# Patient Record
Sex: FEMALE | Race: WHITE | NOT HISPANIC OR LATINO | Employment: UNEMPLOYED | URBAN - NONMETROPOLITAN AREA
[De-identification: names, ages, dates, MRNs, and addresses within clinical notes are randomized per-mention and may not be internally consistent; named-entity substitution may affect disease eponyms.]

---

## 2017-05-01 ENCOUNTER — OFFICE VISIT (OUTPATIENT)
Dept: CARDIOLOGY | Facility: CLINIC | Age: 52
End: 2017-05-01

## 2017-05-01 VITALS
SYSTOLIC BLOOD PRESSURE: 164 MMHG | HEART RATE: 80 BPM | WEIGHT: 172 LBS | OXYGEN SATURATION: 95 % | HEIGHT: 65 IN | DIASTOLIC BLOOD PRESSURE: 101 MMHG | BODY MASS INDEX: 28.66 KG/M2

## 2017-05-01 DIAGNOSIS — R06.02 SOB (SHORTNESS OF BREATH): ICD-10-CM

## 2017-05-01 DIAGNOSIS — I10 ESSENTIAL HYPERTENSION: Primary | ICD-10-CM

## 2017-05-01 DIAGNOSIS — R00.2 PALPITATIONS: ICD-10-CM

## 2017-05-01 PROCEDURE — 93000 ELECTROCARDIOGRAM COMPLETE: CPT | Performed by: PHYSICIAN ASSISTANT

## 2017-05-01 PROCEDURE — 99213 OFFICE O/P EST LOW 20 MIN: CPT | Performed by: PHYSICIAN ASSISTANT

## 2017-05-01 RX ORDER — METOPROLOL SUCCINATE 25 MG/1
25 TABLET, EXTENDED RELEASE ORAL DAILY
Qty: 30 TABLET | Refills: 11 | Status: SHIPPED | OUTPATIENT
Start: 2017-05-01 | End: 2018-02-14

## 2018-02-13 ENCOUNTER — OFFICE VISIT (OUTPATIENT)
Dept: CARDIOLOGY | Facility: CLINIC | Age: 53
End: 2018-02-13

## 2018-02-13 VITALS
OXYGEN SATURATION: 97 % | SYSTOLIC BLOOD PRESSURE: 139 MMHG | BODY MASS INDEX: 26.73 KG/M2 | HEART RATE: 102 BPM | WEIGHT: 160.4 LBS | DIASTOLIC BLOOD PRESSURE: 88 MMHG | HEIGHT: 65 IN

## 2018-02-13 DIAGNOSIS — I44.7 LEFT BUNDLE BRANCH BLOCK: ICD-10-CM

## 2018-02-13 DIAGNOSIS — R06.02 SHORTNESS OF BREATH: ICD-10-CM

## 2018-02-13 DIAGNOSIS — I10 ESSENTIAL HYPERTENSION: Primary | ICD-10-CM

## 2018-02-13 DIAGNOSIS — R00.2 PALPITATION: ICD-10-CM

## 2018-02-13 PROCEDURE — 93000 ELECTROCARDIOGRAM COMPLETE: CPT | Performed by: PHYSICIAN ASSISTANT

## 2018-02-13 PROCEDURE — 99214 OFFICE O/P EST MOD 30 MIN: CPT | Performed by: PHYSICIAN ASSISTANT

## 2018-02-13 NOTE — PROGRESS NOTES
Problem list     Subjective   Debbie Lee is a 53 y.o. female     Chief Complaint   Patient presents with   • Hypertension     Here for routine f/u   • Hyperlipidemia   • Heart Murmur   • Palpitations       HPI       Problem list  1. History of chest pain  1.1 patient underwent treadmill stress testing which she had class II angina symptoms on a Ke protocol which test was aborted. She subsequently underwent catheterization.  1.2 cardiac catheterization in November 2015 which demonstrated normal coronaries and preserved LV function  2. Hypertension  3. Chronic pain  4. Chronic tobacco use    Patient is a 53-year-old female that presents back for follow-up.  She has been doing well.  She has no chest pain or pressure.  She will continue to have very mild dyspnea at baseline.  This is chronic with her history of chronic tobacco use.  No PND orthopnea.  She still will experience palpitations.  She notices an irregular heartbeat.  Metoprolol has helped.  No dizziness presyncope or syncope.  Otherwise is doing well      Outpatient Encounter Prescriptions as of 2/13/2018   Medication Sig Dispense Refill   • atorvastatin (LIPITOR) 40 MG tablet Take 1 tablet by mouth daily.     • benazepril (LOTENSIN) 40 MG tablet Take 1 tablet by mouth every 12 (twelve) hours.     • clopidogrel (PLAVIX) 75 MG tablet Take 1 tablet by mouth daily.     • pantoprazole (PROTONIX) 40 MG EC tablet Take 1 tablet by mouth daily.     • [DISCONTINUED] metoprolol succinate XL (TOPROL-XL) 25 MG 24 hr tablet Take 1 tablet by mouth Daily. 30 tablet 11   • metoprolol succinate XL (TOPROL-XL) 50 MG 24 hr tablet Take 1 tablet by mouth Daily. 30 tablet 6   • [DISCONTINUED] aspirin 81 MG tablet Take 1 tablet by mouth 2 (two) times a day.     • [DISCONTINUED] HYDROcodone-acetaminophen (NORCO) 7.5-325 MG per tablet Take 1 tablet by mouth every 8 (eight) hours as needed for moderate pain (4-6).     • [DISCONTINUED] traMADol (ULTRAM) 50 MG tablet Take  by  "mouth 4 (four) times a day.       No facility-administered encounter medications on file as of 2/13/2018.        Codeine    Past Medical History:   Diagnosis Date   • Hyperlipidemia    • Hypertension    • Rheumatic fever        Social History     Social History   • Marital status: Legally      Spouse name: N/A   • Number of children: N/A   • Years of education: N/A     Occupational History   • Not on file.     Social History Main Topics   • Smoking status: Current Every Day Smoker     Packs/day: 1.00     Years: 25.00     Types: Cigarettes   • Smokeless tobacco: Never Used   • Alcohol use No   • Drug use: No   • Sexual activity: Defer     Other Topics Concern   • Not on file     Social History Narrative       Family History   Problem Relation Age of Onset   • Diabetes Mother    • Hypertension Mother    • Heart disease Father        Review of Systems   Constitutional: Positive for fatigue.   HENT: Negative.    Eyes: Negative.    Respiratory: Positive for shortness of breath.    Cardiovascular: Positive for palpitations. Negative for chest pain and leg swelling.   Gastrointestinal: Negative.    Endocrine: Negative.    Genitourinary: Negative.    Musculoskeletal: Positive for arthralgias and myalgias.   Skin: Negative.    Allergic/Immunologic: Negative.    Neurological: Negative.    Hematological: Negative.    Psychiatric/Behavioral: Positive for sleep disturbance.       Objective   Vitals:    02/13/18 1001   BP: 139/88   BP Location: Left arm   Patient Position: Sitting   Pulse: 102   SpO2: 97%   Weight: 72.8 kg (160 lb 6.4 oz)   Height: 165.1 cm (65\")      /88 (BP Location: Left arm, Patient Position: Sitting)  Pulse 102  Ht 165.1 cm (65\")  Wt 72.8 kg (160 lb 6.4 oz)  SpO2 97%  BMI 26.69 kg/m2    Lab Results (most recent)     None          Physical Exam   Constitutional: She is oriented to person, place, and time. She appears well-developed and well-nourished. No distress.   HENT:   Head: " Normocephalic and atraumatic.   Eyes: EOM are normal. Pupils are equal, round, and reactive to light.   Neck: No JVD present.   Cardiovascular: Normal rate, regular rhythm, normal heart sounds and intact distal pulses.  Exam reveals no gallop and no friction rub.    No murmur heard.  Pulmonary/Chest: Effort normal and breath sounds normal. No respiratory distress. She has no wheezes. She has no rales. She exhibits no tenderness.   Musculoskeletal: Normal range of motion. She exhibits no edema.   Neurological: She is alert and oriented to person, place, and time. No cranial nerve deficit.   Skin: Skin is warm and dry. No rash noted. No erythema. No pallor.   Psychiatric: She has a normal mood and affect. Her behavior is normal.   Nursing note and vitals reviewed.      Procedure     ECG 12 Lead  Date/Time: 2/13/2018 10:03 AM  Performed by: BRODIE DENIS  Authorized by: BRODIE DENIS   Comments: EKG indication shortness of breath    EKG demonstrates sinus tachycardia 10 1 bpm, left bundle-branch block, nonspecific ST-T wave changes               Assessment/Plan     Problems Addressed this Visit        Cardiovascular and Mediastinum    Hypertension - Primary    Relevant Medications    metoprolol succinate XL (TOPROL-XL) 50 MG 24 hr tablet    Other Relevant Orders    ECG 12 Lead    Palpitation    Relevant Orders    ECG 12 Lead    Left bundle branch block    Relevant Medications    metoprolol succinate XL (TOPROL-XL) 50 MG 24 hr tablet       Respiratory    Shortness of breath              Recommendation  1.  On review of patient's EKG, she has significant changes since her last EKG here.  She has a new onset left bundle branch block.  She has no chest pain although dyspnea is mild.  Based on the profound EKG change, I feel a repeat cardiac evaluation would be helpful.  The concern for ischemia but also for a cardiomyopathic process is present.  Patient made aware of this finding but refuses any cardiac evaluation.   She would like to continue on medical therapy aware of the possible cardiac complications.  2.  I would like to increase metoprolol dosing to help with patient's palpitations and inappropriate sinus tachycardia.  3.  We will see her back for follow-up in 6 months or sooner symptoms discussed.  Follow-up primary as scheduled       Electronically signed by:

## 2018-02-14 PROBLEM — I44.7 LEFT BUNDLE BRANCH BLOCK: Status: ACTIVE | Noted: 2018-02-14

## 2018-02-14 PROBLEM — R00.2 PALPITATION: Status: ACTIVE | Noted: 2018-02-14

## 2018-02-14 RX ORDER — METOPROLOL SUCCINATE 50 MG/1
50 TABLET, EXTENDED RELEASE ORAL DAILY
Qty: 30 TABLET | Refills: 6 | Status: SHIPPED | OUTPATIENT
Start: 2018-02-14

## 2018-06-25 ENCOUNTER — TELEPHONE (OUTPATIENT)
Dept: CARDIOLOGY | Facility: CLINIC | Age: 53
End: 2018-06-25

## 2018-06-25 NOTE — TELEPHONE ENCOUNTER
Jose from [facility] called stating he was needing clarification on the patient's metoprolol succ.   I verified the patient was on 50mg instead of the 25mg.

## 2018-09-13 ENCOUNTER — OFFICE VISIT (OUTPATIENT)
Dept: CARDIOLOGY | Facility: CLINIC | Age: 53
End: 2018-09-13

## 2018-09-13 VITALS
WEIGHT: 163.8 LBS | DIASTOLIC BLOOD PRESSURE: 83 MMHG | SYSTOLIC BLOOD PRESSURE: 133 MMHG | HEIGHT: 65 IN | OXYGEN SATURATION: 95 % | BODY MASS INDEX: 27.29 KG/M2 | HEART RATE: 79 BPM

## 2018-09-13 DIAGNOSIS — R60.0 LOWER EXTREMITY EDEMA: ICD-10-CM

## 2018-09-13 DIAGNOSIS — I44.7 LEFT BUNDLE BRANCH BLOCK: Primary | ICD-10-CM

## 2018-09-13 DIAGNOSIS — R06.02 SHORTNESS OF BREATH: ICD-10-CM

## 2018-09-13 PROCEDURE — 99214 OFFICE O/P EST MOD 30 MIN: CPT | Performed by: PHYSICIAN ASSISTANT

## 2018-09-13 RX ORDER — HYDROCHLOROTHIAZIDE 25 MG/1
25 TABLET ORAL DAILY
Qty: 30 TABLET | Refills: 11 | Status: SHIPPED | OUTPATIENT
Start: 2018-09-13 | End: 2020-01-14

## 2018-09-13 RX ORDER — NITROGLYCERIN 0.4 MG/1
TABLET SUBLINGUAL
Qty: 100 TABLET | Refills: 11 | Status: SHIPPED | OUTPATIENT
Start: 2018-09-13

## 2018-09-13 NOTE — PROGRESS NOTES
Problem list     Subjective   Debbie Lee is a 53 y.o. female     Chief Complaint   Patient presents with   • Follow-up     here for 6 month f/u   • Chest Pain   • Palpitations   • Hypertension   • Shortness of Breath       HPI    Problem list  1. History of chest pain  1.1 patient underwent treadmill stress testing which she had class II angina symptoms on a Ke protocol which test was aborted. She subsequently underwent catheterization.  1.2 cardiac catheterization in November 2015 which demonstrated normal coronaries and preserved LV function  2. Hypertension  3. Chronic pain  4. Chronic tobacco use    Patient is a 53-year-old female that presents back to the office for follow-up.  Since being seen last in the office she is complaining of worsening shortness of breath, lower extremity edema and chest pain.  She states her symptoms have been significant since her last visit here.  Symptoms will occur at random.  Shortness of breath as well as with exertion.  She has no PND or orthopnea.  Otherwise, patient is doing well.    Last office visit, patient had significant change in EKG.  From 2000 17/2/2018 she had left bundle-branch block.  On EKG.  We recommended cardiac evaluation she did not want to have that done.  Now she is symptomatic with symptoms concerning for cardiomyopathy and congestive heart failure      Outpatient Encounter Prescriptions as of 9/13/2018   Medication Sig Dispense Refill   • benazepril (LOTENSIN) 40 MG tablet Take 1 tablet by mouth every 12 (twelve) hours.     • metoprolol succinate XL (TOPROL-XL) 50 MG 24 hr tablet Take 1 tablet by mouth Daily. 30 tablet 6   • hydrochlorothiazide (HYDRODIURIL) 25 MG tablet Take 1 tablet by mouth Daily. 30 tablet 11   • nitroglycerin (NITROSTAT) 0.4 MG SL tablet 1 under the tongue as needed for angina, may repeat q5mins for up three doses 100 tablet 11   • [DISCONTINUED] atorvastatin (LIPITOR) 40 MG tablet Take 1 tablet by mouth daily.     •  "[DISCONTINUED] clopidogrel (PLAVIX) 75 MG tablet Take 1 tablet by mouth daily.     • [DISCONTINUED] pantoprazole (PROTONIX) 40 MG EC tablet Take 1 tablet by mouth daily.       No facility-administered encounter medications on file as of 9/13/2018.        Codeine    Past Medical History:   Diagnosis Date   • Hyperlipidemia    • Hypertension    • Kidney stones    • Rheumatic fever        Social History     Social History   • Marital status: Legally      Spouse name: N/A   • Number of children: N/A   • Years of education: N/A     Occupational History   • Not on file.     Social History Main Topics   • Smoking status: Current Every Day Smoker     Packs/day: 1.00     Years: 25.00     Types: Cigarettes   • Smokeless tobacco: Never Used   • Alcohol use No   • Drug use: No   • Sexual activity: Defer     Other Topics Concern   • Not on file     Social History Narrative   • No narrative on file       Family History   Problem Relation Age of Onset   • Diabetes Mother    • Hypertension Mother    • Heart disease Father        Review of Systems   Constitutional: Positive for fatigue.   HENT: Negative.    Eyes: Positive for visual disturbance (glasses prn).   Respiratory: Positive for shortness of breath.    Cardiovascular: Positive for chest pain, palpitations and leg swelling.   Gastrointestinal: Positive for constipation.   Endocrine: Negative.    Genitourinary: Negative.    Musculoskeletal: Positive for arthralgias and back pain.   Skin: Negative.    Allergic/Immunologic: Negative.    Neurological: Negative.    Hematological: Negative.    Psychiatric/Behavioral: Positive for agitation and sleep disturbance. The patient is nervous/anxious.    All other systems reviewed and are negative.      Objective   Vitals:    09/13/18 1104   BP: 133/83   BP Location: Left arm   Patient Position: Sitting   Pulse: 79   SpO2: 95%   Weight: 74.3 kg (163 lb 12.8 oz)   Height: 165.1 cm (65\")      /83 (BP Location: Left arm, " "Patient Position: Sitting)   Pulse 79   Ht 165.1 cm (65\")   Wt 74.3 kg (163 lb 12.8 oz)   SpO2 95%   BMI 27.26 kg/m²     Lab Results (most recent)     None          Physical Exam   Constitutional: She is oriented to person, place, and time. She appears well-developed and well-nourished. No distress.   HENT:   Head: Normocephalic and atraumatic.   Eyes: Pupils are equal, round, and reactive to light. EOM are normal.   Neck: No JVD present.   Cardiovascular: Normal rate, regular rhythm, normal heart sounds and intact distal pulses.  Exam reveals no gallop and no friction rub.    No murmur heard.  Pulmonary/Chest: Effort normal and breath sounds normal. No respiratory distress. She has no wheezes. She has no rales. She exhibits no tenderness.   Musculoskeletal: Normal range of motion. She exhibits no edema.   Neurological: She is alert and oriented to person, place, and time. No cranial nerve deficit.   Skin: Skin is warm and dry. No rash noted. No erythema. No pallor.   Psychiatric: She has a normal mood and affect. Her behavior is normal.   Nursing note and vitals reviewed.      Procedure   Procedures       Assessment/Plan     Problems Addressed this Visit        Cardiovascular and Mediastinum    Left bundle branch block - Primary    Relevant Orders    Adult Transthoracic Echo Complete W/ Cont if Necessary Per Protocol    Stress Test With Myocardial Perfusion One Day    D-dimer, Quantitative       Respiratory    Shortness of breath    Relevant Orders    Adult Transthoracic Echo Complete W/ Cont if Necessary Per Protocol    Stress Test With Myocardial Perfusion One Day    D-dimer, Quantitative       Other    Lower extremity edema    Relevant Orders    Adult Transthoracic Echo Complete W/ Cont if Necessary Per Protocol    Stress Test With Myocardial Perfusion One Day    D-dimer, Quantitative            Recommendation  1.  Because of patient's symptoms, an abnormal EKG, I would like to rule out cardiomyopathy and " congestive heart failure by ordering an echocardiogram.  I would like to obtain stress test to rule out ischemia as a cause of patient's new-onset left bundle-branch block.  2.  I'm starting her on low-dose diuretics at this time.  She is on beta blocker and ACE inhibitor will continue.  I would like to get d-dimer to rule out noncardiac causes of chest pain.  Any chest pain not resolved by nitroglycerin, she is to go to the ER.  She'll follow-up primary as scheduled         I advised Debbie of the risks of continuing to use tobacco, and I provided her with tobacco cessation educational materials in the After Visit Summary.     During this visit, I spent <3 minutes counseling the patient regarding tobacco cessation.     Patient's Body mass index is 27.26 kg/m². BMI is within normal parameters. No follow-up required.       Electronically signed by:

## 2018-10-11 ENCOUNTER — APPOINTMENT (OUTPATIENT)
Dept: CARDIOLOGY | Facility: HOSPITAL | Age: 53
End: 2018-10-11

## 2018-10-11 ENCOUNTER — HOSPITAL ENCOUNTER (OUTPATIENT)
Dept: CARDIOLOGY | Facility: HOSPITAL | Age: 53
Discharge: HOME | End: 2018-10-11
Admitting: PHYSICIAN ASSISTANT

## 2018-10-11 DIAGNOSIS — R60.0 LOWER EXTREMITY EDEMA: ICD-10-CM

## 2018-10-11 DIAGNOSIS — I44.7 LEFT BUNDLE BRANCH BLOCK: ICD-10-CM

## 2018-10-11 DIAGNOSIS — R06.02 SHORTNESS OF BREATH: ICD-10-CM

## 2018-10-11 PROCEDURE — 93306 TTE W/DOPPLER COMPLETE: CPT | Performed by: INTERNAL MEDICINE

## 2018-10-11 PROCEDURE — 93306 TTE W/DOPPLER COMPLETE: CPT

## 2018-10-15 LAB
BH CV ECHO MEAS - ACS: 1.5 CM
BH CV ECHO MEAS - AO MAX PG: 6.2 MMHG
BH CV ECHO MEAS - AO MEAN PG: 2.8 MMHG
BH CV ECHO MEAS - AO ROOT AREA (BSA CORRECTED): 1.4
BH CV ECHO MEAS - AO ROOT AREA: 5 CM^2
BH CV ECHO MEAS - AO ROOT DIAM: 2.5 CM
BH CV ECHO MEAS - AO V2 MAX: 124.9 CM/SEC
BH CV ECHO MEAS - AO V2 MEAN: 75.6 CM/SEC
BH CV ECHO MEAS - AO V2 VTI: 26.4 CM
BH CV ECHO MEAS - BSA(HAYCOCK): 1.9 M^2
BH CV ECHO MEAS - BSA: 1.8 M^2
BH CV ECHO MEAS - BZI_BMI: 27.1 KILOGRAMS/M^2
BH CV ECHO MEAS - BZI_METRIC_HEIGHT: 165.1 CM
BH CV ECHO MEAS - BZI_METRIC_WEIGHT: 73.9 KG
BH CV ECHO MEAS - EDV(CUBED): 87.6 ML
BH CV ECHO MEAS - EDV(TEICH): 89.7 ML
BH CV ECHO MEAS - EF(CUBED): 70.6 %
BH CV ECHO MEAS - EF(TEICH): 62.5 %
BH CV ECHO MEAS - ESV(CUBED): 25.7 ML
BH CV ECHO MEAS - ESV(TEICH): 33.7 ML
BH CV ECHO MEAS - FS: 33.5 %
BH CV ECHO MEAS - IVS/LVPW: 1.3
BH CV ECHO MEAS - IVSD: 1.1 CM
BH CV ECHO MEAS - LA DIMENSION: 3.2 CM
BH CV ECHO MEAS - LA/AO: 1.3
BH CV ECHO MEAS - LV IVRT: 0.1 SEC
BH CV ECHO MEAS - LV MASS(C)D: 143.4 GRAMS
BH CV ECHO MEAS - LV MASS(C)DI: 79.1 GRAMS/M^2
BH CV ECHO MEAS - LVIDD: 4.4 CM
BH CV ECHO MEAS - LVIDS: 3 CM
BH CV ECHO MEAS - LVPWD: 0.85 CM
BH CV ECHO MEAS - MITRAL HR: 114.8 BPM
BH CV ECHO MEAS - MITRAL R-R: 0.52 SEC
BH CV ECHO MEAS - MV A MAX VEL: 52.2 CM/SEC
BH CV ECHO MEAS - MV DEC SLOPE: 360.8 CM/SEC^2
BH CV ECHO MEAS - MV DEC TIME: 0.24 SEC
BH CV ECHO MEAS - MV E MAX VEL: 87.9 CM/SEC
BH CV ECHO MEAS - MV E/A: 1.7
BH CV ECHO MEAS - MV MAX PG: 3.9 MMHG
BH CV ECHO MEAS - MV MEAN PG: 1.6 MMHG
BH CV ECHO MEAS - MV V2 MAX: 98.5 CM/SEC
BH CV ECHO MEAS - MV V2 MEAN: 57.4 CM/SEC
BH CV ECHO MEAS - MV V2 VTI: 30 CM
BH CV ECHO MEAS - PA MAX PG: 6.7 MMHG
BH CV ECHO MEAS - PA MEAN PG: 3.7 MMHG
BH CV ECHO MEAS - PA V2 MAX: 129 CM/SEC
BH CV ECHO MEAS - PA V2 MEAN: 90.6 CM/SEC
BH CV ECHO MEAS - PA V2 VTI: 30.4 CM
BH CV ECHO MEAS - PULM. HR: 178.8 BPM
BH CV ECHO MEAS - PULM. R-R: 0.34 SEC
BH CV ECHO MEAS - RAP SYSTOLE: 10 MMHG
BH CV ECHO MEAS - RVDD: 2.8 CM
BH CV ECHO MEAS - RVSP: 25.4 MMHG
BH CV ECHO MEAS - SI(AO): 72.3 ML/M^2
BH CV ECHO MEAS - SI(CUBED): 34.1 ML/M^2
BH CV ECHO MEAS - SI(TEICH): 30.9 ML/M^2
BH CV ECHO MEAS - SV(AO): 131.1 ML
BH CV ECHO MEAS - SV(CUBED): 61.9 ML
BH CV ECHO MEAS - SV(TEICH): 56 ML
BH CV ECHO MEAS - TR MAX VEL: 195.4 CM/SEC
MAXIMAL PREDICTED HEART RATE: 166 BPM
STRESS TARGET HR: 141 BPM

## 2018-11-08 ENCOUNTER — HOSPITAL ENCOUNTER (OUTPATIENT)
Dept: CARDIOLOGY | Facility: HOSPITAL | Age: 53
Discharge: HOME | End: 2018-11-08

## 2018-11-08 DIAGNOSIS — R60.0 LOWER EXTREMITY EDEMA: ICD-10-CM

## 2018-11-08 DIAGNOSIS — R06.02 SHORTNESS OF BREATH: ICD-10-CM

## 2018-11-08 DIAGNOSIS — I44.7 LEFT BUNDLE BRANCH BLOCK: ICD-10-CM

## 2018-11-08 LAB
BH CV NUCLEAR PRIOR STUDY: 3
BH CV STRESS COMMENTS STAGE 1: NORMAL
BH CV STRESS DOSE REGADENOSON STAGE 1: 0.4
BH CV STRESS DURATION MIN STAGE 1: 0
BH CV STRESS DURATION SEC STAGE 1: 10
BH CV STRESS PROTOCOL 1: NORMAL
BH CV STRESS RECOVERY BP: NORMAL MMHG
BH CV STRESS RECOVERY HR: 93 BPM
BH CV STRESS STAGE 1: 1
MAXIMAL PREDICTED HEART RATE: 166 BPM
PERCENT MAX PREDICTED HR: 61.45 %
STRESS BASELINE BP: NORMAL MMHG
STRESS BASELINE HR: 82 BPM
STRESS PERCENT HR: 72 %
STRESS POST PEAK BP: NORMAL MMHG
STRESS POST PEAK HR: 102 BPM
STRESS TARGET HR: 141 BPM

## 2018-11-08 PROCEDURE — 25010000002 REGADENOSON 0.4 MG/5ML SOLUTION: Performed by: INTERNAL MEDICINE

## 2018-11-08 PROCEDURE — 93017 CV STRESS TEST TRACING ONLY: CPT

## 2018-11-08 PROCEDURE — A9500 TC99M SESTAMIBI: HCPCS | Performed by: INTERNAL MEDICINE

## 2018-11-08 PROCEDURE — 0 TECHNETIUM SESTAMIBI: Performed by: INTERNAL MEDICINE

## 2018-11-08 PROCEDURE — 78452 HT MUSCLE IMAGE SPECT MULT: CPT

## 2018-11-08 PROCEDURE — 78452 HT MUSCLE IMAGE SPECT MULT: CPT | Performed by: INTERNAL MEDICINE

## 2018-11-08 PROCEDURE — 93018 CV STRESS TEST I&R ONLY: CPT | Performed by: INTERNAL MEDICINE

## 2018-11-08 RX ADMIN — TECHNETIUM TC 99M SESTAMIBI 1 DOSE: 1 INJECTION INTRAVENOUS at 13:45

## 2018-11-08 RX ADMIN — TECHNETIUM TC 99M SESTAMIBI 1 DOSE: 1 INJECTION INTRAVENOUS at 12:59

## 2018-11-08 RX ADMIN — REGADENOSON 0.4 MG: 0.08 INJECTION, SOLUTION INTRAVENOUS at 13:45

## 2018-11-15 ENCOUNTER — TELEPHONE (OUTPATIENT)
Dept: CARDIOLOGY | Facility: CLINIC | Age: 53
End: 2018-11-15

## 2019-01-08 ENCOUNTER — OFFICE VISIT (OUTPATIENT)
Dept: CARDIOLOGY | Facility: CLINIC | Age: 54
End: 2019-01-08

## 2019-01-08 VITALS
WEIGHT: 168.4 LBS | SYSTOLIC BLOOD PRESSURE: 143 MMHG | OXYGEN SATURATION: 97 % | HEART RATE: 94 BPM | DIASTOLIC BLOOD PRESSURE: 93 MMHG | BODY MASS INDEX: 28.06 KG/M2 | HEIGHT: 65 IN

## 2019-01-08 DIAGNOSIS — R07.2 PRECORDIAL PAIN: Primary | ICD-10-CM

## 2019-01-08 DIAGNOSIS — R00.2 PALPITATIONS: ICD-10-CM

## 2019-01-08 DIAGNOSIS — R06.02 SHORTNESS OF BREATH: ICD-10-CM

## 2019-01-08 PROCEDURE — 99213 OFFICE O/P EST LOW 20 MIN: CPT | Performed by: PHYSICIAN ASSISTANT

## 2019-01-08 RX ORDER — HYDROCODONE BITARTRATE AND ACETAMINOPHEN 7.5; 325 MG/1; MG/1
TABLET ORAL 3 TIMES DAILY
Refills: 0 | COMMUNITY
Start: 2018-12-22 | End: 2020-01-14

## 2019-01-08 RX ORDER — GABAPENTIN 100 MG/1
CAPSULE ORAL DAILY
Refills: 0 | COMMUNITY
Start: 2019-01-04 | End: 2020-01-14

## 2019-01-08 NOTE — PROGRESS NOTES
Problem list     Subjective   Debbie Lee is a 53 y.o. female     Chief Complaint   Patient presents with   • Follow-up     presents for test f/u   • Palpitations   • Shortness of Breath   • Hypertension   Problem list  1. History of chest pain  1.1 Patient underwent treadmill stress testing which she had class II angina symptoms on a Ke protocol which test was aborted. She subsequently underwent catheterization.  1.2 Cardiac catheterization in November 2015 which demonstrated normal coronaries and preserved LV function  2. Hypertension  3. Chronic pain  4. Chronic tobacco use    HPI  Patient presents back for review test results.  Because of chest discomfort, she was scheduled for stress test and an echo.  Her stress test indicated no evidence of ischemia.  Her echo suggested preserved systolic function with no significant valvular issues otherwise.  The patient currently reports that her chest pain is largely resolved.  Her dyspnea stable.  Her biggest complaint now is of palpitations.  She has her palpitations almost every day to every other day.  She describes both irregularities in heart rhythm and tachycardic episodes.  Sometimes this can last intermittently all day, and sometimes just several seconds and resolved completely.  She has no associated dizziness or syncope.  Otherwise, she has no further complaints.    Current Outpatient Medications   Medication Sig Dispense Refill   • benazepril (LOTENSIN) 40 MG tablet Take 1 tablet by mouth every 12 (twelve) hours.     • gabapentin (NEURONTIN) 100 MG capsule Daily.  0   • hydrochlorothiazide (HYDRODIURIL) 25 MG tablet Take 1 tablet by mouth Daily. 30 tablet 11   • HYDROcodone-acetaminophen (NORCO) 7.5-325 MG per tablet 3 (Three) Times a Day.  0   • metoprolol succinate XL (TOPROL-XL) 50 MG 24 hr tablet Take 1 tablet by mouth Daily. 30 tablet 6   • nitroglycerin (NITROSTAT) 0.4 MG SL tablet 1 under the tongue as needed for angina, may repeat q5mins for up  three doses 100 tablet 11     No current facility-administered medications for this visit.        Codeine    Past Medical History:   Diagnosis Date   • Hyperlipidemia    • Hypertension    • Kidney stones    • Rheumatic fever        Social History     Socioeconomic History   • Marital status: Legally      Spouse name: Not on file   • Number of children: Not on file   • Years of education: Not on file   • Highest education level: Not on file   Social Needs   • Financial resource strain: Not on file   • Food insecurity - worry: Not on file   • Food insecurity - inability: Not on file   • Transportation needs - medical: Not on file   • Transportation needs - non-medical: Not on file   Occupational History   • Not on file   Tobacco Use   • Smoking status: Current Every Day Smoker     Packs/day: 1.00     Years: 25.00     Pack years: 25.00     Types: Cigarettes   • Smokeless tobacco: Never Used   Substance and Sexual Activity   • Alcohol use: No   • Drug use: No   • Sexual activity: Defer   Other Topics Concern   • Not on file   Social History Narrative   • Not on file       Family History   Problem Relation Age of Onset   • Diabetes Mother    • Hypertension Mother    • Heart disease Father        Review of Systems   Constitutional: Positive for diaphoresis (night sweats) and fatigue. Negative for chills and fever.   HENT: Negative.  Negative for congestion, nosebleeds, postnasal drip, rhinorrhea, sinus pressure, sinus pain, sore throat and tinnitus.    Eyes: Positive for visual disturbance (reading glasses).   Respiratory: Positive for shortness of breath (with daily activity). Negative for apnea, cough, chest tightness and wheezing.    Cardiovascular: Positive for palpitations (flutters). Negative for chest pain and leg swelling.   Gastrointestinal: Negative.  Negative for abdominal pain, blood in stool, constipation, diarrhea, nausea and vomiting.   Endocrine: Negative.    Genitourinary: Negative for hematuria.  "  Musculoskeletal: Positive for arthralgias and back pain. Negative for myalgias and neck pain.   Skin: Negative.    Allergic/Immunologic: Negative.  Negative for environmental allergies and food allergies.   Neurological: Positive for numbness (hands). Negative for dizziness, weakness, light-headedness and headaches.   Hematological: Negative.  Does not bruise/bleed easily.   Psychiatric/Behavioral: Positive for agitation and sleep disturbance (insomnia). The patient is nervous/anxious.        Objective   Vitals:    01/08/19 1443   BP: 143/93   BP Location: Left arm   Patient Position: Sitting   Pulse: 94   SpO2: 97%   Weight: 76.4 kg (168 lb 6.4 oz)   Height: 165.1 cm (65\")      /93 (BP Location: Left arm, Patient Position: Sitting)   Pulse 94   Ht 165.1 cm (65\")   Wt 76.4 kg (168 lb 6.4 oz)   SpO2 97%   BMI 28.02 kg/m²    Lab Results (most recent)     None        Physical Exam   Constitutional: She is oriented to person, place, and time. She appears well-developed and well-nourished. No distress.   HENT:   Head: Normocephalic and atraumatic.   Eyes: EOM are normal. Pupils are equal, round, and reactive to light.   Neck: No JVD present.   Cardiovascular: Normal rate, regular rhythm, normal heart sounds and intact distal pulses. Exam reveals no gallop and no friction rub.   No murmur heard.  Pulmonary/Chest: Effort normal and breath sounds normal. No respiratory distress. She has no wheezes. She has no rales. She exhibits no tenderness.   Musculoskeletal: Normal range of motion. She exhibits no edema.   Neurological: She is alert and oriented to person, place, and time. No cranial nerve deficit.   Skin: Skin is warm and dry. No rash noted. No erythema. No pallor.   Psychiatric: She has a normal mood and affect. Her behavior is normal.   Nursing note and vitals reviewed.        Procedure   Procedures       Assessment/Plan      Diagnosis Plan   1. Precordial pain  Cardiac Event Monitor   2. Shortness of " breath  Cardiac Event Monitor   3. Palpitations  Cardiac Event Monitor       The patient reports that she is doing well for the most part.  Her biggest complaint at this time is of palpitations and tachycardia.  I will schedule for a seven-day event monitor to evaluate for dysrhythmic activity.  Her stress test and echo studies were unremarkable.  I feel nothing further is indicated in that regard.  If event monitor findings are largely benign, we can see her back on a six-month basis.  She will call for any issues prior to follow-up.         I advised Debbie of the risks of continuing to use tobacco, and I provided her with tobacco cessation educational materials in the After Visit Summary.     During this visit, I spent <3 minutes counseling the patient regarding tobacco cessation.     Patient's Body mass index is 28.02 kg/m². BMI is within normal parameters. No follow-up required.             Electronically signed by:

## 2020-01-14 ENCOUNTER — OFFICE VISIT (OUTPATIENT)
Dept: CARDIOLOGY | Facility: CLINIC | Age: 55
End: 2020-01-14

## 2020-01-14 VITALS
HEART RATE: 74 BPM | OXYGEN SATURATION: 100 % | HEIGHT: 65 IN | WEIGHT: 162 LBS | SYSTOLIC BLOOD PRESSURE: 113 MMHG | BODY MASS INDEX: 26.99 KG/M2 | DIASTOLIC BLOOD PRESSURE: 72 MMHG

## 2020-01-14 DIAGNOSIS — R06.2 WHEEZING ON BOTH SIDES OF CHEST: Primary | ICD-10-CM

## 2020-01-14 DIAGNOSIS — R07.2 PRECORDIAL PAIN: ICD-10-CM

## 2020-01-14 DIAGNOSIS — I10 ESSENTIAL HYPERTENSION: ICD-10-CM

## 2020-01-14 DIAGNOSIS — K59.09 OTHER CONSTIPATION: ICD-10-CM

## 2020-01-14 PROCEDURE — 93000 ELECTROCARDIOGRAM COMPLETE: CPT | Performed by: NURSE PRACTITIONER

## 2020-01-14 PROCEDURE — 99213 OFFICE O/P EST LOW 20 MIN: CPT | Performed by: NURSE PRACTITIONER

## 2020-01-14 NOTE — PROGRESS NOTES
Subjective     Debbie Lee is a 54 y.o. female who presents to day for Chest Pain (Yearly follow up).    CHIEF COMPLIANT  Chief Complaint   Patient presents with   • Chest Pain     Yearly follow up       Active Problems:  Problem List Items Addressed This Visit        Cardiovascular and Mediastinum    Hypertension       Respiratory    SOB (shortness of breath) - Primary       Nervous and Auditory    Chest pain      Other Visit Diagnoses     Wheezing on both sides of chest        Other constipation        Relevant Medications    polyethylene glycol (MIRALAX) powder powder          HPI  HPI  Ms. lee is a 54-year-old female who is being followed up today for her yearly follow-up for chest pain.  Ms. lee is is doing well and reports that her symptoms have diminished and only occur on a rare occasion.  Patient verbalized that her chest pain has dramatically reduced.  She did have 1 week ago intermittent sharp pain in her chest with no associated symptoms.  It occurred at random and nothing seemed to make it better or worse.  Stated that it did not last very long.  States that she is no longer had palpitations after she got on the current medication regimen.  However she does have fatigue in which she stays tired and becomes tired very easily.  Patient is able to perform activities of daily living.  Blood pressure is well controlled today at 113/72 with a heart rate of 74.  Patient denies any shortness of breath, PND, orthopnea, palpitations, syncope, or further neurological changes.  PRIOR MEDS  Current Outpatient Medications on File Prior to Visit   Medication Sig Dispense Refill   • benazepril (LOTENSIN) 40 MG tablet Take 1 tablet by mouth every 12 (twelve) hours.     • metoprolol succinate XL (TOPROL-XL) 50 MG 24 hr tablet Take 1 tablet by mouth Daily. 30 tablet 6   • nitroglycerin (NITROSTAT) 0.4 MG SL tablet 1 under the tongue as needed for angina, may repeat q5mins for up three doses 100 tablet 11     No  current facility-administered medications on file prior to visit.        ALLERGIES  Codeine    HISTORY  Past Medical History:   Diagnosis Date   • Hyperlipidemia    • Hypertension    • Kidney stones    • Pinched nerve     right arm   • Rheumatic fever        Social History     Socioeconomic History   • Marital status: Legally      Spouse name: Not on file   • Number of children: Not on file   • Years of education: Not on file   • Highest education level: Not on file   Tobacco Use   • Smoking status: Current Every Day Smoker     Packs/day: 1.00     Years: 25.00     Pack years: 25.00     Types: Cigarettes   • Smokeless tobacco: Never Used   Substance and Sexual Activity   • Alcohol use: No   • Drug use: No   • Sexual activity: Defer       Family History   Problem Relation Age of Onset   • Diabetes Mother    • Hypertension Mother    • Heart disease Father        Review of Systems   Constitutional: Positive for fatigue. Negative for chills and fever.   HENT: Negative.  Negative for congestion.    Respiratory: Negative.  Negative for chest tightness and shortness of breath.    Cardiovascular: Positive for chest pain. Negative for palpitations and leg swelling.   Gastrointestinal: Positive for constipation. Negative for abdominal pain, blood in stool, diarrhea, nausea and vomiting.   Endocrine: Negative.  Negative for cold intolerance and heat intolerance.   Genitourinary: Negative.  Negative for dysuria, frequency, hematuria and urgency.   Musculoskeletal: Positive for back pain. Negative for neck pain.   Skin: Negative.  Negative for rash and wound.   Allergic/Immunologic: Negative.  Negative for environmental allergies and food allergies.   Neurological: Negative.  Negative for dizziness, syncope and light-headedness.   Hematological: Negative.  Does not bruise/bleed easily.   Psychiatric/Behavioral: Negative.  Negative for sleep disturbance (denies waking with cp or soa).       Objective     VITALS: /72  "(BP Location: Left arm, Patient Position: Sitting)   Pulse 74   Ht 165.1 cm (65\")   Wt 73.5 kg (162 lb)   SpO2 100%   BMI 26.96 kg/m²     LABS:   Lab Results (most recent)     None          IMAGING:   No Images in the past 120 days found..    EXAM:  Physical Exam   Constitutional: She is oriented to person, place, and time. She appears well-developed and well-nourished.   HENT:   Head: Normocephalic and atraumatic.   Eyes: Pupils are equal, round, and reactive to light. EOM are normal.   Neck: Trachea normal and phonation normal. Neck supple. No JVD present. Carotid bruit is not present. No thyroid mass present.   Cardiovascular: Normal rate, regular rhythm, normal heart sounds and intact distal pulses. Exam reveals no gallop and no friction rub.   No murmur heard.  Pulses:       Radial pulses are 2+ on the right side, and 2+ on the left side.        Posterior tibial pulses are 2+ on the right side, and 2+ on the left side.   Pulmonary/Chest: Effort normal. No respiratory distress. She has wheezes in the right middle field and the left middle field. She has no rales.   Abdominal: Soft. Bowel sounds are normal. She exhibits no distension and no abdominal bruit. There is no tenderness.   Musculoskeletal: Normal range of motion.   Neurological: She is alert and oriented to person, place, and time. She has normal strength. No cranial nerve deficit or sensory deficit.   Skin: Skin is warm, dry and intact. Capillary refill takes less than 2 seconds. No rash noted.   Psychiatric: She has a normal mood and affect. Her speech is normal and behavior is normal. Judgment and thought content normal. Cognition and memory are normal.   Nursing note and vitals reviewed.      Procedure     ECG 12 Lead  Date/Time: 1/14/2020 1:25 PM  Performed by: Evaristo Zafar APRN  Authorized by: Evaristo Zafar APRN   Comparison: compared with previous ECG from 2/13/2018  Comparison to previous ECG: EKG is much improved with a narrow QRS " complex.  Rhythm: sinus rhythm  Rate: normal  BPM: 77  QRS axis: normal    Clinical impression: non-specific ECG               Assessment/Plan    Diagnosis Plan   1. SOB (shortness of breath)     2. Wheezing on both sides of chest     3. Precordial pain     4. Essential hypertension     5. Other constipation  polyethylene glycol (MIRALAX) powder powder   1.  Patient has expiratory wheezes bilaterally throughout.  Patient states that she always wheezes and denies any shortness of breath.  Patient advised to follow-up with Dr. Salazar if symptoms persist or get worse.  2.  Patient has atypical chest pain that is occurring on rare occasion.  Patient verbalized that she is done relatively well from a cardiovascular standpoint and no further intervention is needed at this time.  3.  Patient's blood pressure is well controlled on current medication regimen.  Today her blood pressure is 113/72 with a heart rate of 74.  Patient advised to monitor blood pressure on a daily basis and report any persistent highs or lows.  4.  Patient does complain of constipation.  Will prescribe MiraLAX daily until stool becomes a soft  and may stop.  When stool begins to become more formed and early signs of constipation resume MiraLAX.  5.  Informed of signs and symptoms of ACS and advised to seek emergent treatment for any new worsening symptoms.  Patient also advised sooner follow-up as needed.    Return in about 1 year (around 1/14/2021), or if symptoms worsen or fail to improve.    Debbie was seen today for chest pain.    Diagnoses and all orders for this visit:    SOB (shortness of breath)    Wheezing on both sides of chest    Precordial pain    Essential hypertension    Other constipation  -     polyethylene glycol (MIRALAX) powder powder; Take 17 g by mouth Daily.        Debbie Lee  reports that she has been smoking cigarettes. She has a 25.00 pack-year smoking history. She has never used smokeless tobacco.. I have educated her  on the risk of diseases from using tobacco products such as cancer, COPD and heart diease.     I advised her to quit and she is willing to quit. We have discussed the following method/s for tobacco cessation:  Counseling.      I spent 3  minutes counseling the patient.           Patient's Body mass index is 26.96 kg/m². BMI is above normal parameters. Recommendations include: educational material.           MEDS ORDERED DURING VISIT:  New Medications Ordered This Visit   Medications   • polyethylene glycol (MIRALAX) powder powder     Sig: Take 17 g by mouth Daily.     Dispense:  765 g     Refill:  5           This document has been electronically signed by Evaristo Zafar Jr., APRN  January 16, 2020 8:12 PM

## 2020-01-14 NOTE — PATIENT INSTRUCTIONS
Steps to Quit Smoking    Smoking tobacco can be bad for your health. It can also affect almost every organ in your body. Smoking puts you and people around you at risk for many serious long-lasting (chronic) diseases. Quitting smoking is hard, but it is one of the best things that you can do for your health. It is never too late to quit.  What are the benefits of quitting smoking?  When you quit smoking, you lower your risk for getting serious diseases and conditions. They can include:  · Lung cancer or lung disease.  · Heart disease.  · Stroke.  · Heart attack.  · Not being able to have children (infertility).  · Weak bones (osteoporosis) and broken bones (fractures).  If you have coughing, wheezing, and shortness of breath, those symptoms may get better when you quit. You may also get sick less often. If you are pregnant, quitting smoking can help to lower your chances of having a baby of low birth weight.  What can I do to help me quit smoking?  Talk with your doctor about what can help you quit smoking. Some things you can do (strategies) include:  · Quitting smoking totally, instead of slowly cutting back how much you smoke over a period of time.  · Going to in-person counseling. You are more likely to quit if you go to many counseling sessions.  · Using resources and support systems, such as:  ? Online chats with a counselor.  ? Phone quitlines.  ? Printed self-help materials.  ? Support groups or group counseling.  ? Text messaging programs.  ? Mobile phone apps or applications.  · Taking medicines. Some of these medicines may have nicotine in them. If you are pregnant or breastfeeding, do not take any medicines to quit smoking unless your doctor says it is okay. Talk with your doctor about counseling or other things that can help you.  Talk with your doctor about using more than one strategy at the same time, such as taking medicines while you are also going to in-person counseling. This can help make  quitting easier.  What things can I do to make it easier to quit?  Quitting smoking might feel very hard at first, but there is a lot that you can do to make it easier. Take these steps:  · Talk to your family and friends. Ask them to support and encourage you.  · Call phone quitlines, reach out to support groups, or work with a counselor.  · Ask people who smoke to not smoke around you.  · Avoid places that make you want (trigger) to smoke, such as:  ? Bars.  ? Parties.  ? Smoke-break areas at work.  · Spend time with people who do not smoke.  · Lower the stress in your life. Stress can make you want to smoke. Try these things to help your stress:  ? Getting regular exercise.  ? Deep-breathing exercises.  ? Yoga.  ? Meditating.  ? Doing a body scan. To do this, close your eyes, focus on one area of your body at a time from head to toe, and notice which parts of your body are tense. Try to relax the muscles in those areas.  · Download or buy apps on your mobile phone or tablet that can help you stick to your quit plan. There are many free apps, such as QuitGuide from the CDC (Centers for Disease Control and Prevention). You can find more support from smokefree.gov and other websites.  This information is not intended to replace advice given to you by your health care provider. Make sure you discuss any questions you have with your health care provider.  Document Released: 10/14/2010 Document Revised: 08/15/2017 Document Reviewed: 05/03/2016  [employer] Interactive Patient Education © 2019 [employer]    Acute Coronary Syndrome    Acute coronary syndrome (ACS) is a serious problem in which there is suddenly not enough blood and oxygen reaching the heart. ACS can result in chest pain or a heart attack.  This condition is a medical emergency. If you have any symptoms of this condition, get help right away.  What are the causes?  This condition may be caused by:  · Buildup of fat and cholesterol inside of the arteries  (atherosclerosis). This is the most common cause. The buildup (plaque) can cause blood vessels in the heart (coronary arteries) to become narrow or blocked, which reduces blood flow to the heart. Plaque can also break off and lead to a clot, which can block an artery and cause a heart attack or stroke.  · Sudden tightening of the muscles around the coronary arteries (coronary spasm).  · Tearing of a coronary artery (spontaneous coronary artery dissection).  · Very low blood pressure (hypotension).  · An abnormal heartbeat (arrhythmia).  · Other medical conditions that cause a decrease of oxygen to the heart, such as anemiaorrespiratory failure.  · Using cocaine or methamphetamine.  What increases the risk?  The following factors may make you more likely to develop this condition:  · Age. The risk for ACS increases as you get older.  · History of chest pain, heart attack, peripheral artery disease, or stroke.  · Having taken chemotherapy or immune-suppressing medicines.  · Being male.  · Family history of chest pain, heart disease, or stroke.  · Smoking.  · Not exercising enough.  · Being overweight.  · High cholesterol.  · High blood pressure (hypertension).  · Diabetes.  · Excessive alcohol use.  What are the signs or symptoms?  Common symptoms of this condition include:  · Chest pain. The pain may last a long time, or it may stop and come back (recur). It may feel like:  ? Crushing or squeezing.  ? Tightness, pressure, fullness, or heaviness.  · Arm, neck, jaw, or back pain.  · Heartburn or indigestion.  · Shortness of breath.  · Nausea.  · Sudden cold sweats.  · Light-headedness.  · Dizziness, or passing out.  · Tiredness (fatigue).  Sometimes there are no symptoms.  How is this diagnosed?  This condition may be diagnosed based on:  · Your medical history and symptoms.  · An electrocardiogram (ECG). This imaging test measures the heart's electrical activity.  · Blood tests. Cardiac blood tests may need to be  repeated at designated time intervals.  · Chest X-ray.  · A CT scan of the chest.  · A coronary angiogram. This is a procedure in which dye is injected into the bloodstream and then X-rays are taken to show if there is a blockage in a coronary artery.  · Exercise stress testing.  · Echocardiography. This is a test that uses sound waves to produce detailed images of the heart.  How is this treated?  The treatment is to restore blood flow to the heart as soon as possible. Treatment for this condition may include:  · Oxygen therapy.  · Medicines, such as:  ? Antiplatelet medicines and blood-thinning medicines, such as aspirin. These help prevent blood clots.  ? Medicine that dissolves any blood clots (fibrinolytic therapy).  ? Blood pressure medicines.  ? Nitroglycerin. This helps relieve chest pain and widens blood vessels to improve blood flow.  ? Pain medicine.  ? Cholesterol-lowering medicine.  · Surgery, such as:  ? Coronary angioplasty with stent placement. This involves placing a small piece of metal that looks like mesh or a spring into a narrow coronary artery. This widens the artery and keep it open.  ? Coronary artery bypass surgery. This involves taking a section of a blood vessel from a different part of your body, and placing it on the blocked coronary artery to allow blood to flow around (bypass) the blockage.  · Cardiac rehabilitation. This is a program that helps improve your health and well-being. It includes exercise training, education, and counseling to help you recover.  Follow these instructions at home:  Eating and drinking  · Eat a heart-healthy diet that includes whole grains, fruits and vegetables, lean proteins, and low-fat or nonfat dairy products.  · Limit how much salt (sodium) you eat as told by your health care provider. Follow instructions from your health care provider about any other eating or drinking restrictions, such as limiting foods that are high in fat and processed  sugars.  · Use healthy cooking methods such as roasting, grilling, broiling, baking, poaching, steaming, or stir-frying.  · Talk with a dietitian to learn about healthy cooking methods and how to eat less sodium.  Medicines  · Take over-the-counter and prescription medicines only as told by your health care provider.  · Do not take these medicines unless your health care provider approves:  ? Vitamin supplements that contain vitamin A or vitamin E.  ? Nonsteroidal anti-inflammatory drugs (NSAIDs), such as ibuprofen, naproxen, or celecoxib.  ? Hormone replacement therapy that contains estrogen.  If you are taking blood thinners:  · Talk with your health care provider before you take any medicines that contain aspirin or NSAIDs. These medicines increase your risk for dangerous bleeding.  · Take your medicine exactly as told, at the same time every day.  · Avoid activities that could cause injury or bruising, and follow instructions about how to prevent falls.  · Wear a medical alert bracelet, and carry a card that lists what medicines you take.  Activity  · Join a cardiac rehabilitation program. An exercise plan will be developed for you.  · Ask your health care provider:  ? What activities and exercises are safe for you.  ? If you should follow specific instructions about lifting, driving, or climbing stairs.  Lifestyle  · Do not use any products that contain nicotine or tobacco, such as cigarettes and e-cigarettes. If you need help quitting, ask your health care provider.  · If your health care provider says that alcohol is safe for you, limit your alcohol intake to no more than 1 drink a day. One drink equals 12 oz of beer, 5 oz of wine, or 1½ oz of hard liquor.  · Maintain a healthy weight. If you need to lose weight, work with your health care provider to do so safely.  General instructions  · Tell all the health care providers who care for you about your heart condition, including your dentist. This may affect  the medicines or treatment you receive.  · Manage any other health conditions you have, such as hypertension or diabetes. These conditions affect your heart.  · Learn ways to manage stress.  · Get screened for depression, and get mental health treatment if you need it. People with ACS are at higher risk for depression.  · Keep your vaccinations up to date. Get the flu shot (influenza vaccine) every year.  · If directed, monitor your blood pressure at home.  · Keep all follow-up visits as told by your health care provider. This is important.  Contact a health care provider if:  · You feel overwhelmed or sad.  · You have trouble doing your daily activities.  Get help right away if:  · You have pain in your chest, neck, arm, jaw, stomach, or back that recurs, and:  ? It lasts for more than a few minutes.  ? It is not relieved by taking the medicineyour health care provider prescribed.  · You have unexplained:  ? Heavy sweating.  ? Heartburn or indigestion.  ? Nausea or vomiting.  ? Shortness of breath.  ? Difficulty breathing.  ? Fatigue.  ? Nervousness or anxiety.  ? Weakness.  ? Diarrhea.  ? Dark stools or blood in your stool.  · You have sudden light-headedness or dizziness.  · Your blood pressure is higher than 180/120.  · You faint.  · You have thoughts about hurting yourself.  These symptoms may represent a serious problem that is an emergency. Do not wait to see if the symptoms will go away. Get medical help right away. Call your local emergency services (311 in the [place] Do not drive yourself to the hospital.   If you ever feel like you may hurt yourself or others, or have thoughts about taking your own life, get help right away. You can go to your nearest emergency department or call:  · Emergency services (068 in the [place]  · A suicide crisis helpline, such as the National Suicide Prevention Lifeline at 1-941.990.9118. This is open 24 hours a day.  Summary  · Acute coronary syndrome (ACS) is when there is  not enough blood and oxygen being supplied to the heart. ACS can result in chest pain or a heart attack.  · Acute coronary syndrome is a medical emergency. If you have any symptoms of this condition, get help right away.  · Treatment includes medicines and procedures to open the blocked arteries and restore blood flow.  This information is not intended to replace advice given to you by your health care provider. Make sure you discuss any questions you have with your health care provider.  Document Released: 12/18/2006 Document Revised: 08/28/2018 Document Reviewed: 08/28/2018  [employer] Interactive Patient Education © 2019 [employer]